# Patient Record
(demographics unavailable — no encounter records)

---

## 2017-04-14 NOTE — NUR
[facility].  REPORTS 3/10 CHEST PAIN.

## 2017-04-15 NOTE — NUR
REPORT RECIEVED FROM SISSY DUVAL; PT RESTING IN BED; EASLIY AROUSED; PT
DENIES ANY CP AT THIS TIME; TELE IN PLACE; CALL LIGHT WITHIN REACH; WILL
CONTNIUE TO MONITOR

## 2017-04-15 NOTE — NUR
PT ASLEEP AT THIS TIME. NO SIGNS OF PAIN OR DISCOMFORT NOTED. MORPHINE GIVEN
EARILER FOR C/O CHEST PAIN WITH GOOD EFFECT. RESPIRATIONS EVEN AND UNLABORED.
SAFETY MEASURES IN PLACE. CALL LIGHT WITHIN REACH.

## 2017-04-15 NOTE — NUR
Discharge instructions given. Patient verbalizes understanding of same.
Discharged in stable condition via Ambulatory to Home with
. All belongings sent with pt.

## 2017-04-15 NOTE — NUR
PT ARRIVED TO UNIT VIA WHEELCHAIR WITH ALTAGRACIA GODINEZ. DENIES PAIN AT THIS TIME.
RESPIRATIONS EVEN AND UNLABORED. ORIENTED TO ROOM. FAMILY AT BEDSIDE. PLAN OF
CARE DICUSSED. PT ENCOURAGED TO VERBALIZE CONCERNS. STATES THAT SHE HAS TO GO
HOME IN THE MORNING. SAFETY MEASURES IN PLACE. CALL LIGHT WITHIN REACH.